# Patient Record
Sex: MALE | Race: WHITE | NOT HISPANIC OR LATINO | Employment: FULL TIME | URBAN - METROPOLITAN AREA
[De-identification: names, ages, dates, MRNs, and addresses within clinical notes are randomized per-mention and may not be internally consistent; named-entity substitution may affect disease eponyms.]

---

## 2023-01-06 ENCOUNTER — APPOINTMENT (OUTPATIENT)
Dept: RADIOLOGY | Facility: MEDICAL CENTER | Age: 33
End: 2023-01-06

## 2023-01-06 ENCOUNTER — OFFICE VISIT (OUTPATIENT)
Dept: URGENT CARE | Facility: MEDICAL CENTER | Age: 33
End: 2023-01-06

## 2023-01-06 VITALS
TEMPERATURE: 98.3 F | DIASTOLIC BLOOD PRESSURE: 67 MMHG | HEART RATE: 77 BPM | OXYGEN SATURATION: 100 % | RESPIRATION RATE: 20 BRPM | SYSTOLIC BLOOD PRESSURE: 126 MMHG

## 2023-01-06 DIAGNOSIS — M94.0 COSTOCHONDRITIS: ICD-10-CM

## 2023-01-06 DIAGNOSIS — M94.0 COSTOCHONDRITIS: Primary | ICD-10-CM

## 2023-01-06 RX ORDER — BENZONATATE 100 MG/1
100 CAPSULE ORAL 3 TIMES DAILY PRN
Qty: 20 CAPSULE | Refills: 0 | Status: SHIPPED | OUTPATIENT
Start: 2023-01-06

## 2023-01-06 NOTE — PATIENT INSTRUCTIONS
Cough  Tessalon pearls as needed for cough  Costochondritis  Over the counter pain medication  Follow up with PCP in 3-5 days  Proceed to  ER if symptoms worsen

## 2023-01-06 NOTE — PROGRESS NOTES
[facility]        NAME: Darío Cavanaugh is a 28 y o  male  : 1990    MRN: [id number]  DATE: 2023  TIME: 11:50 AM    Assessment and Plan   Costochondritis [M94 0]  1  Costochondritis  XR chest pa & lateral            Patient Instructions     Cough  Tessalon pearls as needed for cough  Costochondritis  Over the counter pain medication  Follow up with PCP in 3-5 days  Proceed to  ER if symptoms worsen  Chief Complaint     Chief Complaint   Patient presents with   • Chest Pain     Patient states starting jose alejandro he has had nasal congestion, chest congestion; since this morning he has had chest pressure (left sided) associated with shortness of breath          History of Present Illness       35-year-old male who presents complaining of cough productive of yellow sputum, congestion, runny nose x1 week  Patient states he has been coughing so much that his chest hurts  Denies shortness of breath, fevers, chills  Has been taking over-the-counter medications with temporary relief  States he did a COVID-19 test at home which was negative  Review of Systems   Review of Systems   Constitutional: Negative  HENT: Positive for congestion  Eyes: Negative  Respiratory: Positive for cough  Negative for apnea, choking, chest tightness, shortness of breath, wheezing and stridor  Cardiovascular: Negative  Negative for chest pain  Current Medications     No current outpatient medications on file  Current Allergies     Allergies as of 2023   • (No Known Allergies)            The following portions of the patient's history were reviewed and updated as appropriate: allergies, current medications, past family history, past medical history, past social history, past surgical history and problem list      History reviewed  No pertinent past medical history  History reviewed  No pertinent surgical history  History reviewed  No pertinent family history        Medications have been verified  Objective   /67   Pulse 77   Temp 98 3 °F (36 8 °C) (Temporal)   Resp 20   SpO2 100%        Physical Exam     Physical Exam  Constitutional:       General: He is not in acute distress  Appearance: He is well-developed  He is not diaphoretic  Cardiovascular:      Rate and Rhythm: Normal rate and regular rhythm  Heart sounds: Normal heart sounds  Pulmonary:      Effort: Pulmonary effort is normal  No respiratory distress  Breath sounds: Normal breath sounds  No wheezing or rales  Chest:      Chest wall: No tenderness  Musculoskeletal:      Cervical back: Normal range of motion and neck supple  Lymphadenopathy:      Cervical: No cervical adenopathy

## 2023-01-08 LAB
ATRIAL RATE: 61 BPM
P AXIS: 78 DEGREES
PR INTERVAL: 130 MS
QRS AXIS: 80 DEGREES
QRSD INTERVAL: 122 MS
QT INTERVAL: 408 MS
QTC INTERVAL: 410 MS
T WAVE AXIS: 66 DEGREES
VENTRICULAR RATE: 61 BPM

## 2024-08-15 ENCOUNTER — OFFICE VISIT (OUTPATIENT)
Dept: URGENT CARE | Facility: MEDICAL CENTER | Age: 34
End: 2024-08-15

## 2024-08-15 VITALS
WEIGHT: 169 LBS | SYSTOLIC BLOOD PRESSURE: 137 MMHG | OXYGEN SATURATION: 96 % | RESPIRATION RATE: 18 BRPM | HEART RATE: 74 BPM | TEMPERATURE: 98.2 F | DIASTOLIC BLOOD PRESSURE: 67 MMHG | BODY MASS INDEX: 28.85 KG/M2 | HEIGHT: 64 IN

## 2024-08-15 DIAGNOSIS — G43.909 MIGRAINE WITHOUT STATUS MIGRAINOSUS, NOT INTRACTABLE, UNSPECIFIED MIGRAINE TYPE: ICD-10-CM

## 2024-08-15 DIAGNOSIS — U07.1 COVID: Primary | ICD-10-CM

## 2024-08-15 DIAGNOSIS — R11.0 NAUSEA: ICD-10-CM

## 2024-08-15 PROCEDURE — 99213 OFFICE O/P EST LOW 20 MIN: CPT | Performed by: PHYSICIAN ASSISTANT

## 2024-08-15 RX ORDER — NAPROXEN 500 MG/1
500 TABLET ORAL 2 TIMES DAILY WITH MEALS
Qty: 14 TABLET | Refills: 0 | Status: SHIPPED | OUTPATIENT
Start: 2024-08-15

## 2024-08-15 RX ORDER — ONDANSETRON 4 MG/1
8 TABLET, FILM COATED ORAL EVERY 8 HOURS PRN
Qty: 20 TABLET | Refills: 0 | Status: SHIPPED | OUTPATIENT
Start: 2024-08-15

## 2024-08-15 NOTE — PROGRESS NOTES
[facility]        NAME: Claudy Mosley is a 34 y.o. male  : 1990    MRN: [id number]  DATE: August 15, 2024  TIME: 2:44 PM    Assessment and Plan   COVID [U07.1]  1. COVID  naproxen (Naprosyn) 500 mg tablet    ondansetron (ZOFRAN) 4 mg tablet      2. Migraine without status migrainosus, not intractable, unspecified migraine type  naproxen (Naprosyn) 500 mg tablet      3. Nausea  ondansetron (ZOFRAN) 4 mg tablet            Patient Instructions     Rx naproxen 500mg tablets 2x daily x7 days.  Rx ondansetron 8mg up to 3x daily for nausea.  Stay well hydrated and get lots of rest.  Follow up with PCP in 3-5 days.  ER if symptoms severe.    Chief Complaint     Chief Complaint   Patient presents with    Nausea     Last week had covid.  Three days ago started with migraine, dizziness and nausea.  No chest pain or shortness of breath.  Yesterday blood pressure 133/88 and resting heart rate 66.         History of Present Illness       Patient is a 35 yo male who presents with a known COVID infection, which began last Saturday, 8/10/24, with hx of current everyday smoker/vaping, and presents with new onset of migraine yesterday with nausea and vomiting that has persisted into today. Energy levels are down. Drinking extra water today. Denies fever. Denies changes to appetite.        Review of Systems   Review of Systems   Constitutional:  Positive for fatigue. Negative for appetite change and fever.   HENT: Negative.     Respiratory:  Positive for cough.    Cardiovascular: Negative.    Gastrointestinal:  Positive for nausea and vomiting.   Skin: Negative.    Neurological:  Positive for headaches.         Current Medications       Current Outpatient Medications:     naproxen (Naprosyn) 500 mg tablet, Take 1 tablet (500 mg total) by mouth 2 (two) times a day with meals, Disp: 14 tablet, Rfl: 0    ondansetron (ZOFRAN) 4 mg tablet, Take 2 tablets (8 mg total) by mouth every 8 (eight) hours as needed for nausea or  "vomiting, Disp: 20 tablet, Rfl: 0    benzonatate (TESSALON PERLES) 100 mg capsule, Take 1 capsule (100 mg total) by mouth 3 (three) times a day as needed for cough (Patient not taking: Reported on 8/15/2024), Disp: 20 capsule, Rfl: 0    Current Allergies     Allergies as of 08/15/2024    (No Known Allergies)            The following portions of the patient's history were reviewed and updated as appropriate: allergies, current medications, past family history, past medical history, past social history, past surgical history and problem list.     Past Medical History:   Diagnosis Date    Current every day smoker        History reviewed. No pertinent surgical history.    No family history on file.      Medications have been verified.        Objective   /67   Pulse 74   Temp 98.2 °F (36.8 °C) (Temporal)   Resp 18   Ht 5' 4\" (1.626 m)   Wt 76.7 kg (169 lb)   SpO2 96%   BMI 29.01 kg/m²        Physical Exam     Physical Exam  Vitals reviewed.   Constitutional:       Appearance: Normal appearance. He is normal weight.   HENT:      Head: Normocephalic and atraumatic.      Right Ear: Tympanic membrane, ear canal and external ear normal.      Left Ear: Tympanic membrane, ear canal and external ear normal.      Nose: Congestion present.      Mouth/Throat:      Mouth: Mucous membranes are moist.      Pharynx: Oropharynx is clear. No posterior oropharyngeal erythema.   Cardiovascular:      Rate and Rhythm: Normal rate and regular rhythm.      Pulses: Normal pulses.      Heart sounds: Normal heart sounds. No murmur heard.  Pulmonary:      Effort: Pulmonary effort is normal. No respiratory distress.      Breath sounds: Normal breath sounds.   Abdominal:      General: Abdomen is flat. Bowel sounds are normal. There is no distension.      Palpations: Abdomen is soft.      Tenderness: There is no abdominal tenderness. There is no guarding.   Musculoskeletal:      Cervical back: Normal range of motion and neck supple. "   Lymphadenopathy:      Cervical: No cervical adenopathy.   Skin:     General: Skin is warm and dry.      Capillary Refill: Capillary refill takes less than 2 seconds.   Neurological:      Mental Status: He is alert.

## 2024-08-15 NOTE — LETTER
August 15, 2024     Patient: Claudy Mosley   YOB: 1990   Date of Visit: 8/15/2024       To Whom it May Concern:    Claudy Mosley was seen in my clinic on 8/15/2024. He may return to work on 8/16/2024 .    If you have any questions or concerns, please don't hesitate to call.         Sincerely,          Dallas Mcnulty PA-C        CC: No Recipients

## 2024-08-15 NOTE — PATIENT INSTRUCTIONS
Rx naproxen 500mg tablets 2x daily x7 days.  Rx ondansetron 8mg up to 3x daily for nausea.  Stay well hydrated and get lots of rest.  Follow up with PCP in 3-5 days.  ER if symptoms severe.

## 2025-08-06 ENCOUNTER — OFFICE VISIT (OUTPATIENT)
Dept: FAMILY MEDICINE CLINIC | Facility: CLINIC | Age: 35
End: 2025-08-06
Payer: COMMERCIAL

## 2025-08-06 VITALS
OXYGEN SATURATION: 98 % | TEMPERATURE: 98 F | BODY MASS INDEX: 30.7 KG/M2 | SYSTOLIC BLOOD PRESSURE: 104 MMHG | WEIGHT: 191 LBS | RESPIRATION RATE: 16 BRPM | DIASTOLIC BLOOD PRESSURE: 68 MMHG | HEIGHT: 66 IN | HEART RATE: 65 BPM
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
## 2025-08-06 DIAGNOSIS — G89.29 NECK PAIN, CHRONIC: Primary | ICD-10-CM

## 2025-08-06 DIAGNOSIS — M54.2 NECK PAIN, CHRONIC: Primary | ICD-10-CM

## 2025-08-06 DIAGNOSIS — M54.16 LUMBAR BACK PAIN WITH RADICULOPATHY AFFECTING RIGHT LOWER EXTREMITY: ICD-10-CM

## 2025-08-06 DIAGNOSIS — M54.6 ACUTE MIDLINE THORACIC BACK PAIN: ICD-10-CM

## 2025-08-06 PROCEDURE — 99213 OFFICE O/P EST LOW 20 MIN: CPT | Performed by: INTERNAL MEDICINE

## 2025-08-06 RX ORDER — METHYLPREDNISOLONE 4 MG/1
TABLET ORAL
Qty: 21 EACH | Refills: 0 | Status: SHIPPED | OUTPATIENT
Start: 2025-08-06

## 2025-08-06 RX ORDER — METHOCARBAMOL 750 MG/1
750 TABLET, FILM COATED ORAL EVERY 6 HOURS PRN
Qty: 60 TABLET | Refills: 0 | Status: SHIPPED | OUTPATIENT
Start: 2025-08-06

## 2025-08-06 RX ORDER — METHOCARBAMOL 500 MG/1
500 TABLET, FILM COATED ORAL
COMMUNITY
Start: 2025-07-25

## 2025-08-06 RX ORDER — IBUPROFEN 600 MG/1
600 TABLET, FILM COATED ORAL EVERY 6 HOURS PRN
COMMUNITY
Start: 2025-07-25

## 2025-08-19 ENCOUNTER — EVALUATION (OUTPATIENT)
Dept: PHYSICAL THERAPY | Facility: CLINIC | Age: 35
End: 2025-08-19
Attending: INTERNAL MEDICINE
Payer: COMMERCIAL
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
## 2025-08-19 DIAGNOSIS — M54.2 NECK PAIN: ICD-10-CM

## 2025-08-19 DIAGNOSIS — M54.6 ACUTE MIDLINE THORACIC BACK PAIN: ICD-10-CM

## 2025-08-19 DIAGNOSIS — M54.16 LUMBAR BACK PAIN WITH RADICULOPATHY AFFECTING RIGHT LOWER EXTREMITY: ICD-10-CM

## 2025-08-19 DIAGNOSIS — G89.29 NECK PAIN, CHRONIC: Primary | ICD-10-CM

## 2025-08-19 DIAGNOSIS — M54.2 NECK PAIN, CHRONIC: Primary | ICD-10-CM

## 2025-08-19 PROCEDURE — 97140 MANUAL THERAPY 1/> REGIONS: CPT | Performed by: PHYSICAL THERAPIST

## 2025-08-19 PROCEDURE — 97161 PT EVAL LOW COMPLEX 20 MIN: CPT | Performed by: PHYSICAL THERAPIST
